# Patient Record
Sex: FEMALE | Race: WHITE
[De-identification: names, ages, dates, MRNs, and addresses within clinical notes are randomized per-mention and may not be internally consistent; named-entity substitution may affect disease eponyms.]

---

## 2022-05-30 ENCOUNTER — HOSPITAL ENCOUNTER (EMERGENCY)
Dept: HOSPITAL 46 - ED | Age: 27
Discharge: HOME | End: 2022-05-30
Payer: COMMERCIAL

## 2022-05-30 VITALS — BODY MASS INDEX: 53.15 KG/M2 | HEIGHT: 62 IN | WEIGHT: 288.81 LBS

## 2022-05-30 DIAGNOSIS — E11.9: Primary | ICD-10-CM

## 2022-05-30 DIAGNOSIS — Z88.8: ICD-10-CM

## 2022-12-17 ENCOUNTER — HOSPITAL ENCOUNTER (EMERGENCY)
Dept: HOSPITAL 46 - ED | Age: 27
Discharge: HOME | End: 2022-12-17
Payer: COMMERCIAL

## 2022-12-17 VITALS — BODY MASS INDEX: 53 KG/M2 | WEIGHT: 287.99 LBS | HEIGHT: 62 IN

## 2022-12-17 DIAGNOSIS — H66.92: Primary | ICD-10-CM

## 2022-12-17 DIAGNOSIS — H60.92: ICD-10-CM

## 2022-12-17 DIAGNOSIS — Z88.8: ICD-10-CM
